# Patient Record
Sex: MALE | Race: WHITE | NOT HISPANIC OR LATINO | URBAN - METROPOLITAN AREA
[De-identification: names, ages, dates, MRNs, and addresses within clinical notes are randomized per-mention and may not be internally consistent; named-entity substitution may affect disease eponyms.]

---

## 2019-06-12 ENCOUNTER — EMERGENCY (EMERGENCY)
Facility: HOSPITAL | Age: 64
LOS: 1 days | Discharge: HOME | End: 2019-06-12
Attending: EMERGENCY MEDICINE
Payer: COMMERCIAL

## 2019-06-12 VITALS
SYSTOLIC BLOOD PRESSURE: 134 MMHG | TEMPERATURE: 98 F | RESPIRATION RATE: 18 BRPM | DIASTOLIC BLOOD PRESSURE: 96 MMHG | WEIGHT: 199.96 LBS | HEIGHT: 69 IN | HEART RATE: 79 BPM

## 2019-06-12 PROCEDURE — 99284 EMERGENCY DEPT VISIT MOD MDM: CPT

## 2019-06-12 PROCEDURE — 93010 ELECTROCARDIOGRAM REPORT: CPT

## 2019-06-12 NOTE — ED ADULT TRIAGE NOTE - CHIEF COMPLAINT QUOTE
Pt. cris c/o witnessed seizure like activity by friend.  As per EMS, no seizure activity en route, denies sz hx. Presents to Ed A&Ox4, alert,  cooperative with no complaints of pain. Able to ambulate with steady gait noted, denies pain

## 2019-06-13 VITALS
SYSTOLIC BLOOD PRESSURE: 137 MMHG | TEMPERATURE: 98 F | DIASTOLIC BLOOD PRESSURE: 84 MMHG | OXYGEN SATURATION: 98 % | RESPIRATION RATE: 18 BRPM | HEART RATE: 75 BPM

## 2019-06-13 DIAGNOSIS — I71.2 THORACIC AORTIC ANEURYSM, WITHOUT RUPTURE: ICD-10-CM

## 2019-06-13 DIAGNOSIS — Z90.49 ACQUIRED ABSENCE OF OTHER SPECIFIED PARTS OF DIGESTIVE TRACT: Chronic | ICD-10-CM

## 2019-06-13 LAB
ALBUMIN SERPL ELPH-MCNC: 4.7 G/DL — SIGNIFICANT CHANGE UP (ref 3.3–5.2)
ALP SERPL-CCNC: 179 U/L — HIGH (ref 40–120)
ALT FLD-CCNC: 35 U/L — SIGNIFICANT CHANGE UP
AMPHET UR-MCNC: NEGATIVE — SIGNIFICANT CHANGE UP
ANION GAP SERPL CALC-SCNC: 13 MMOL/L — SIGNIFICANT CHANGE UP (ref 5–17)
APPEARANCE UR: CLEAR — SIGNIFICANT CHANGE UP
APTT BLD: 26.3 SEC — LOW (ref 27.5–36.3)
AST SERPL-CCNC: 28 U/L — SIGNIFICANT CHANGE UP
BARBITURATES UR SCN-MCNC: NEGATIVE — SIGNIFICANT CHANGE UP
BASOPHILS # BLD AUTO: 0 K/UL — SIGNIFICANT CHANGE UP (ref 0–0.2)
BASOPHILS NFR BLD AUTO: 0.2 % — SIGNIFICANT CHANGE UP (ref 0–2)
BENZODIAZ UR-MCNC: NEGATIVE — SIGNIFICANT CHANGE UP
BILIRUB SERPL-MCNC: 0.5 MG/DL — SIGNIFICANT CHANGE UP (ref 0.4–2)
BILIRUB UR-MCNC: NEGATIVE — SIGNIFICANT CHANGE UP
BUN SERPL-MCNC: 26 MG/DL — HIGH (ref 8–20)
CALCIUM SERPL-MCNC: 9.7 MG/DL — SIGNIFICANT CHANGE UP (ref 8.6–10.2)
CHLORIDE SERPL-SCNC: 102 MMOL/L — SIGNIFICANT CHANGE UP (ref 98–107)
CK MB CFR SERPL CALC: 5.3 NG/ML — SIGNIFICANT CHANGE UP (ref 0–6.7)
CK SERPL-CCNC: 155 U/L — SIGNIFICANT CHANGE UP (ref 30–200)
CO2 SERPL-SCNC: 24 MMOL/L — SIGNIFICANT CHANGE UP (ref 22–29)
COCAINE METAB.OTHER UR-MCNC: NEGATIVE — SIGNIFICANT CHANGE UP
COLOR SPEC: YELLOW — SIGNIFICANT CHANGE UP
CREAT SERPL-MCNC: 0.99 MG/DL — SIGNIFICANT CHANGE UP (ref 0.5–1.3)
DIFF PNL FLD: ABNORMAL
EOSINOPHIL # BLD AUTO: 0 K/UL — SIGNIFICANT CHANGE UP (ref 0–0.5)
EOSINOPHIL NFR BLD AUTO: 0.5 % — SIGNIFICANT CHANGE UP (ref 0–5)
ETHANOL SERPL-MCNC: <10 MG/DL — SIGNIFICANT CHANGE UP
GLUCOSE SERPL-MCNC: 116 MG/DL — HIGH (ref 70–115)
GLUCOSE UR QL: NEGATIVE MG/DL — SIGNIFICANT CHANGE UP
HCT VFR BLD CALC: 48.9 % — SIGNIFICANT CHANGE UP (ref 42–52)
HGB BLD-MCNC: 16.9 G/DL — SIGNIFICANT CHANGE UP (ref 14–18)
INR BLD: 0.97 RATIO — SIGNIFICANT CHANGE UP (ref 0.88–1.16)
KETONES UR-MCNC: NEGATIVE — SIGNIFICANT CHANGE UP
LACTATE BLDV-MCNC: 1.2 MMOL/L — SIGNIFICANT CHANGE UP (ref 0.5–2)
LEUKOCYTE ESTERASE UR-ACNC: NEGATIVE — SIGNIFICANT CHANGE UP
LYMPHOCYTES # BLD AUTO: 1.2 K/UL — SIGNIFICANT CHANGE UP (ref 1–4.8)
LYMPHOCYTES # BLD AUTO: 11.8 % — LOW (ref 20–55)
MAGNESIUM SERPL-MCNC: 2.2 MG/DL — SIGNIFICANT CHANGE UP (ref 1.6–2.6)
MCHC RBC-ENTMCNC: 31.8 PG — HIGH (ref 27–31)
MCHC RBC-ENTMCNC: 34.6 G/DL — SIGNIFICANT CHANGE UP (ref 32–36)
MCV RBC AUTO: 91.9 FL — SIGNIFICANT CHANGE UP (ref 80–94)
METHADONE UR-MCNC: NEGATIVE — SIGNIFICANT CHANGE UP
MONOCYTES # BLD AUTO: 0.8 K/UL — SIGNIFICANT CHANGE UP (ref 0–0.8)
MONOCYTES NFR BLD AUTO: 7.9 % — SIGNIFICANT CHANGE UP (ref 3–10)
NEUTROPHILS # BLD AUTO: 7.8 K/UL — SIGNIFICANT CHANGE UP (ref 1.8–8)
NEUTROPHILS NFR BLD AUTO: 79.5 % — HIGH (ref 37–73)
NITRITE UR-MCNC: NEGATIVE — SIGNIFICANT CHANGE UP
OPIATES UR-MCNC: NEGATIVE — SIGNIFICANT CHANGE UP
PCP SPEC-MCNC: SIGNIFICANT CHANGE UP
PCP UR-MCNC: NEGATIVE — SIGNIFICANT CHANGE UP
PH UR: 7 — SIGNIFICANT CHANGE UP (ref 5–8)
PHOSPHATE SERPL-MCNC: 2 MG/DL — LOW (ref 2.4–4.7)
PLATELET # BLD AUTO: 228 K/UL — SIGNIFICANT CHANGE UP (ref 150–400)
POTASSIUM SERPL-MCNC: 3.9 MMOL/L — SIGNIFICANT CHANGE UP (ref 3.5–5.3)
POTASSIUM SERPL-SCNC: 3.9 MMOL/L — SIGNIFICANT CHANGE UP (ref 3.5–5.3)
PROT SERPL-MCNC: 7.6 G/DL — SIGNIFICANT CHANGE UP (ref 6.6–8.7)
PROT UR-MCNC: NEGATIVE MG/DL — SIGNIFICANT CHANGE UP
PROTHROM AB SERPL-ACNC: 11.2 SEC — SIGNIFICANT CHANGE UP (ref 10–12.9)
RBC # BLD: 5.32 M/UL — SIGNIFICANT CHANGE UP (ref 4.6–6.2)
RBC # FLD: 12.4 % — SIGNIFICANT CHANGE UP (ref 11–15.6)
RBC CASTS # UR COMP ASSIST: SIGNIFICANT CHANGE UP /HPF (ref 0–4)
SODIUM SERPL-SCNC: 139 MMOL/L — SIGNIFICANT CHANGE UP (ref 135–145)
SP GR SPEC: 1.01 — SIGNIFICANT CHANGE UP (ref 1.01–1.02)
THC UR QL: POSITIVE
TROPONIN T SERPL-MCNC: <0.01 NG/ML — SIGNIFICANT CHANGE UP (ref 0–0.06)
TSH SERPL-MCNC: 2.47 UIU/ML — SIGNIFICANT CHANGE UP (ref 0.27–4.2)
UROBILINOGEN FLD QL: NEGATIVE MG/DL — SIGNIFICANT CHANGE UP
WBC # BLD: 9.8 K/UL — SIGNIFICANT CHANGE UP (ref 4.8–10.8)
WBC # FLD AUTO: 9.8 K/UL — SIGNIFICANT CHANGE UP (ref 4.8–10.8)

## 2019-06-13 PROCEDURE — 82962 GLUCOSE BLOOD TEST: CPT

## 2019-06-13 PROCEDURE — 80307 DRUG TEST PRSMV CHEM ANLYZR: CPT

## 2019-06-13 PROCEDURE — 99223 1ST HOSP IP/OBS HIGH 75: CPT

## 2019-06-13 PROCEDURE — 83605 ASSAY OF LACTIC ACID: CPT

## 2019-06-13 PROCEDURE — 36415 COLL VENOUS BLD VENIPUNCTURE: CPT

## 2019-06-13 PROCEDURE — 81001 URINALYSIS AUTO W/SCOPE: CPT

## 2019-06-13 PROCEDURE — 99284 EMERGENCY DEPT VISIT MOD MDM: CPT

## 2019-06-13 PROCEDURE — 85610 PROTHROMBIN TIME: CPT

## 2019-06-13 PROCEDURE — 82550 ASSAY OF CK (CPK): CPT

## 2019-06-13 PROCEDURE — 70450 CT HEAD/BRAIN W/O DYE: CPT | Mod: 26

## 2019-06-13 PROCEDURE — 84484 ASSAY OF TROPONIN QUANT: CPT

## 2019-06-13 PROCEDURE — 83735 ASSAY OF MAGNESIUM: CPT

## 2019-06-13 PROCEDURE — 84100 ASSAY OF PHOSPHORUS: CPT

## 2019-06-13 PROCEDURE — 70450 CT HEAD/BRAIN W/O DYE: CPT

## 2019-06-13 PROCEDURE — 93005 ELECTROCARDIOGRAM TRACING: CPT

## 2019-06-13 PROCEDURE — 82553 CREATINE MB FRACTION: CPT

## 2019-06-13 PROCEDURE — 85730 THROMBOPLASTIN TIME PARTIAL: CPT

## 2019-06-13 PROCEDURE — 71275 CT ANGIOGRAPHY CHEST: CPT

## 2019-06-13 PROCEDURE — 71275 CT ANGIOGRAPHY CHEST: CPT | Mod: 26

## 2019-06-13 PROCEDURE — 85027 COMPLETE CBC AUTOMATED: CPT

## 2019-06-13 PROCEDURE — 80053 COMPREHEN METABOLIC PANEL: CPT

## 2019-06-13 PROCEDURE — 84443 ASSAY THYROID STIM HORMONE: CPT

## 2019-06-13 RX ORDER — SODIUM,POTASSIUM PHOSPHATES 278-250MG
2 POWDER IN PACKET (EA) ORAL ONCE
Refills: 0 | Status: COMPLETED | OUTPATIENT
Start: 2019-06-13 | End: 2019-06-13

## 2019-06-13 RX ADMIN — Medication 2 PACKET(S): at 06:08

## 2019-06-13 NOTE — ED PROVIDER NOTE - PHYSICAL EXAMINATION
Constitutional : Appears comfortably, talking in full sentences  Head :NC AT , no swelling  Eyes :eomi, no swelling  Mouth :mm moist,  Neck : supple, trachea in midline  Chest :Aguila air entry, symm chest expansion, no distress  Heart :S1 S2 distant  Abdomen :abd soft, non tender  Musc/Skel :ext no swelling, no deformity, no spine tenderness, distal pulses present  Neuro  : AAO*3, follows commands  motor aguila upper and lower ext 5/5  sensory symm and intact  CN 2-12 grossly intact  no ataxia, no nystagmus  finger to nose, symm aguila, no pronator drift

## 2019-06-13 NOTE — ED PROVIDER NOTE - CHPI ED SYMPTOMS POS
Detail Level: Detailed
Add 18607 Cpt? (Important Note: In 2017 The Use Of 75860 Is Being Tracked By Cms To Determine Future Global Period Reimbursement For Global Periods): no
SEIZURE/CHANGE IN LEVEL OF CONSCIOUSNESS

## 2019-06-13 NOTE — CONSULT NOTE ADULT - ASSESSMENT
64M with incidental finding of ascending aortic aneurysm in setting of witnessed syncope vs seizure.    Patient lives in [place], and has plans to stay through the weekend and drive back in [facility] on Monday. Patient was instructed to follow up with primary care doctor and cardiologist for blood pressure control and medication adjustment to avoid hypotension. Would advise discharge on lower dose of lisinopril and to avoid ETOH/Marijuana. Patient would be candidate for aortic registry but [place]. Discussed with Dr Martinez.

## 2019-06-13 NOTE — ED PROVIDER NOTE - CHPI ED SYMPTOMS NEG
no nausea/no vomiting/no headache, no chest pain, no visual changes, no chills, no diarrhea/no fever

## 2019-06-13 NOTE — ED ADULT NURSE NOTE - NSIMPLEMENTINTERV_GEN_ALL_ED
Implemented All Universal Safety Interventions:  [place] to call system. Call bell, personal items and telephone within reach. Instruct patient to call for assistance. Room bathroom lighting operational. Non-slip footwear when patient is off stretcher. Physically safe environment: no spills, clutter or unnecessary equipment. Stretcher in lowest position, wheels locked, appropriate side rails in place.

## 2019-06-13 NOTE — CONSULT NOTE ADULT - SUBJECTIVE AND OBJECTIVE BOX
History of Present Illness:  HPI: 64 M presented for witnessed seizure. Patient had driven 14 hours straight, had felt well although may have been somewhat dehydrated, and then had consumed ETOH and marijuana with friends after taking lisinopril, (which he feels drops his blood pressure and causes him to feel lightheaded normally.) He said he felt that way for a moment before passing out and friend's described seizure like movements. On CT scan of chest to R/O PE, an incidental finding of an ascending aortic aneurysm was noted. Patient otherwise in good health and active. Denies CP, back pain, SOB, N/V.       Past Medical History  High cholesterol  Hypertension      Past Surgical History  History of appendectomy  No significant past surgical history      MEDICATIONS  (STANDING):    MEDICATIONS  (PRN):  Lisinopril  Statin    Allergies: No Known Allergies      SOCIAL HISTORY:  Smoker: [ ] Yes  [ x] No        PACK YEARS:                         WHEN QUIT?  ETOH use: [ ] Yes  [ ] No              FREQUENCY / QUANTITY: social  Ilicit Drug use:  [ x] Yes  [ ] No occasional marijuana  Occupation: retired  Lives with wife in [place]  Assist device use: none    PMD: in [place]  Referring Cardiologist: none    Relevant Family History  FAMILY HISTORY:  Father: stroke, cancer    Review of Systems  GENERAL:  Fevers[] chills[] sweats[] fatigue[] weight loss[] weight gain []                                      [x ] NEGATIVE  NEURO:  parathesias[] seizures [x]  syncope [?]  confusion []                           LIGHTHEADED AFTER TAKES LISINOPRIL                                     [ ] NEGATIVE                 EYES: glasses[]  blurry vision[]  discharge[] pain[] glaucoma []                                                           [x ] NEGATIVE                 ENMT:  difficulty hearing []  vertigo[]  dysphagia[] epistaxis[] recent dental work []                     [x ] NEGATIVE                 CV:  chest pain[] palpitations[] KRAMER [] diaphoresis [] edema[]                                                           [x ] NEGATIVE                                 RESPIRATORY:  wheezing[] SOB[] cough [] sputum[] hemoptysis[]                                                   [x ] NEGATIVE               GI:  nausea[]  vomiting []  diarrhea[] constipation [] melena []             DIARRHEA AFTER LISINOPRIL IF TAKEN IN AM                                             [ ] NEGATIVE            : hematuria[ ]  dysuria[ ] urgency[] incontinence[]                                                                          [x ] NEGATIVE                    MUSKULOSKELETAL:  arthritis[ ]  joint swelling [ ] muscle weakness [ ]                                            [ x] NEGATIVE                     SKIN/BREAST:  rash[ ] itching [ ]  hair loss[ ] masses[ ]                                                                          [ x] NEGATIVE                     PSYCH:  dementia [ ] depression [ ] anxiety[ ]                                                                                          [x ] NEGATIVE                        HEME/LYMPH:  bruises easily[ ] enlarged lymph nodes[ ] tender lymph nodes[ ]                           [x ] NEGATIVE                      ENDOCRINE:  cold intolerance[ ] heat intolerance[ ] polydipsia[ ]                                                      [x ] NEGATIVE                        Telemetry: SR    PHYSICAL EXAM  Vital Signs Last 24 Hrs  T(C): 36.8 (2019 08:05), Max: 36.8 (2019 08:05)  T(F): 98.2 (2019 08:05), Max: 98.2 (2019 08:05)  HR: 75 (2019 08:05) (75 - 88)  BP: 137/84 (2019 08:05) (134/96 - 143/95)  BP(mean): --  RR: 18 (2019 08:05) (16 - 18)  SpO2: 98% (2019 08:05) (98% - 99%)    General: Well nourished, well developed, no acute distress.                                                         Neuro: Normal exam oriented to person/place & time with no focal motor or sensory  deficits.                    Eyes: Normal exam of conjunctiva & lids, pupils equally reactive.   ENT: Normal exam of nasal/oral mucosa with absence of cyanosis.   Neck: Normal exam of jugular veins, trachea & thyroid.   Chest: Normal lung exam with good air movement absence of wheezes, rales, or rhonchi:                                                                          CV:  Auscultation: normal [x ] S3[ ] S4[ ] Irregular [ ] Rub[ ] Clicks[ ]  Murmurs none:[x ]systolic [ ]  diastolic [ ] holosystolic [ ]  Carotids: No Bruits[x ] Other____________ Abdominal Aorta: normal [x ] nonpalpable[ ]                                                                         GI: Normal exam of abdomen, liver & spleen with no noted masses or tenderness.                                                                                              Extremities: Normal no evidence of cyanosis or deformity Edema: none[x ]trace[ ]1+[ ]2+[ ]3+[ ]4+[ ]  Lower Extremity Pulses: Right[ +] Left[+ ]Varicosities[ ]  SKIN : Normal exam to inspection & palpation.                                                           LABS:                        16.9   9.8   )-----------( 228      ( 2019 02:07 )             48.9     06-13    139  |  102  |  26.0<H>  ----------------------------<  116<H>  3.9   |  24.0  |  0.99    Ca    9.7      2019 02:07  Phos  2.0       Mg     2.2         TPro  7.6  /  Alb  4.7  /  TBili  0.5  /  DBili  x   /  AST  28  /  ALT  35  /  AlkPhos  179<H>      PT/INR - ( 2019 02:07 )   PT: 11.2 sec;   INR: 0.97 ratio         PTT - ( 2019 02:07 )  PTT:26.3 sec  Urinalysis Basic - ( 2019 02:07 )    Color: Yellow / Appearance: Clear / S.010 / pH: x  Gluc: x / Ketone: Negative  / Bili: Negative / Urobili: Negative mg/dL   Blood: x / Protein: Negative mg/dL / Nitrite: Negative   Leuk Esterase: Negative / RBC: 0-2 /HPF / WBC x   Sq Epi: x / Non Sq Epi: x / Bacteria: x      CARDIAC MARKERS ( 2019 02:07 )  x     / <0.01 ng/mL / 155 U/L / x     / 5.3 ng/mL            < from: CT Angio Chest w/ IV Cont (19 @ 04:05) >    FINDINGS:    LUNGS AND AIRWAYS: Patent central airways.  Right lower lobe air cyst. No   consolidation or suspicious pulmonary nodule. Punctate calcified   granuloma in the right upper lobe.    PLEURA: No pleural effusion.    MEDIASTINUM AND EUGENIA: No lymphadenopathy.    VESSELS: No pulmonary embolism. Main pulmonary artery normal in caliber.   Aneurysmal dilatation of the ascending aorta up to 4.2 cm.    HEART: Heart size is normal. No pericardial effusion.    CHEST WALL AND LOWER NECK: Within normal limits.    VISUALIZED UPPER ABDOMEN: Within normal limits.    BONES: Degenerative changes.    IMPRESSION:       1. No pulmonary embolism.  2. Aneurysmal dilatation of the ascending aorta up to 4.2 cm.    < end of copied text >

## 2019-06-13 NOTE — ED PROVIDER NOTE - OBJECTIVE STATEMENT
65 y/o m 14 hr drive, had dinner 2 beer, marijuana.  anxiety, everything closed in.  has smoked w liquor, not usually w beer.    no ha, did syncopize.  happened PTA, no vision changes  happened 8- 830.   seizure when passed out, woke up and was sweating and low BP.   hx murmur, no lung problems, no tobacco use, no alcohol problems  colonscopy fine, no endosc  +stress, +echo good long  HTN, recent travel, no med changes.   Crestor and HTN, HTN in morning and crestor at night bc cramping. HTN caused diarrhea.  appendectomy  auras with headaches, self medicate with ibruprofen.  friends say: contracted, not alert. sleeping snoring.  then incoherent. 65 y/o M with PMHx of heart murmur and HTN BIBA presents to ED c/o seizures onset 5 hours. Pt states that he went on a 14 hr drive today, and had 2 beers and some marijuana upon returning home. He then states "everything closed in, like anxiety." Patient stated he passed out, had a seizure, then woke up sweating with a low BP. Event was witnessed by friends, now at bedside. Pt has smoked marijuana along with alcohol in the past, but not beer, with no similar symptoms. Patient has no n/v/d, chest pain, SOB, fevers, chills, headache or visual changes. Friends who witnessed episode state that he went not alert, contracted, and then began to sleep and snore, entering an incoherent state where he was sweating, they then called EMS.  Pt has HTN, taking Crestor and HTN med. When taking medications together, pt experienced cramping. Once  the time he takes his medications, states that the HTN med gives him diarrhea. SHx appendectomy. Has had a colonoscopy, results were normal. Pt had a recent stress test and echo, both with normal results. Pt also has headaches with auras, in which he self medicates with ibuprofen.

## 2019-06-13 NOTE — ED PROVIDER NOTE - CLINICAL SUMMARY MEDICAL DECISION MAKING FREE TEXT BOX
63 y/o M with hx of social alcohol use and occasional marijuana use, drove for over 12 hours with poor fluid intake, takes diuretic every day, noted to have seizure like activity after use of alcohol and marijuana, witnessed by friends. Plans to do labs, CT, observe, and reevaluate.

## 2019-06-13 NOTE — ED ADULT NURSE REASSESSMENT NOTE - NS ED NURSE REASSESS COMMENT FT1
Pt resting comfortably in bed. No s/s of distress noted at this time. Awaiting dispo. Safety maintained.
cardio PA. at bedside to evaluate pt.
Received report from night shift RN. PT. A&Ox4. Pt. resting comfortably in no apparent distress.  As per monitor tech Janie, pt. broke A-fib at 0335 this morning into NSR with bundle branch block. Pt. denies pain or discomfort at this time.

## 2019-06-13 NOTE — ED ADULT NURSE NOTE - OBJECTIVE STATEMENT
Assumed care of patient at 0000, alert and oriented x4. As per patient he lives in [place], is visiting friends, drove 14 hours today. Pt stated he was in the yard, stated he had 2 drinks and had " a seizure". Pt reports " everything got dark and I passed out, but when I woke up I felt fine". As per patients friends he was " shaking and incoherent for about 2 minutes." Pt presents alert and oriented x4, denies pain, denies SOB or chest pains. Pt placed on CM, Afib noted to monitor. Pt denies HX of afib, DR Zuniga at bedside and aware. Iv placed labs sent. Steady gait noted. Pt neurologically intact. pt educated on plan of care, pt able to successfully teach back plan of care to RN, RN will continue to reeducate pt during hospital stay.

## 2019-06-13 NOTE — ED PROVIDER NOTE - NS ED ROS FT
no weight change, no fever or chills  + recent travel, no recent abox, no sick contacts  no recent change in medications  no rash, no bruises  no visual changes no eye discharge  no cough cold or congestion,   no sob, no chest pain  follows with cardiology  + stress test, + cath  no orthopnea, no pnd  no abd pain, no n/v/d  no endoscopy, + colonoscopy  no hematuria, no change in urinary habits  no joint pain, no deformity  +seizure, no headache, no paresthesia

## 2021-08-21 NOTE — ED ADULT NURSE NOTE - IS THE PATIENT ABLE TO BE SCREENED?
Problem: At Risk for Falls  Goal: # Patient does not fall  Outcome: Outcome Met, Continue evaluating goal progress toward completion  Goal: # Takes action to control fall-related risks  Outcome: Outcome Met, Continue evaluating goal progress toward completion  Goal: # Verbalizes understanding of fall risk/precautions  Description: Document education using the patient education activity  Outcome: Outcome Met, Continue evaluating goal progress toward completion     Problem: Pressure Injury, Risk for  Goal: # Skin remains intact  Outcome: Outcome Met, Continue evaluating goal progress toward completion  Goal: No new pressure injury (PI) development  Outcome: Outcome Met, Continue evaluating goal progress toward completion  Goal: # Verbalizes understanding of PI risk factors and prevention strategies  Description: Document education using the patient education activity.   Outcome: Outcome Met, Continue evaluating goal progress toward completion     Problem: VTE, Risk for  Goal: # No s/s of VTE  Outcome: Outcome Met, Continue evaluating goal progress toward completion  Goal: # Verbalizes understanding of VTE risk factors and prevention  Description: Document education using the patient education activity.   Outcome: Outcome Met, Continue evaluating goal progress toward completion     Problem: Airway Clearance Ineffective  Goal: Air exchange is effective with SpO2 =92% or >92% (or as ordered), without signs of aspiration  Outcome: Outcome Not Met, Plan Adjusted  Goal: Transtracheal oxygen catheter is intact with no s/s of obstructionon  Outcome: Outcome Not Met, Plan Adjusted     Problem: Pneumonia  Goal: S/S of acute pneumonia are resolved  Description: If acute pneumonia is present, monitor for resolution of fever, cough, secretions and other test values based on presentation.  Outcome: Outcome Not Met, Plan Adjusted  Goal: Verbalizes understanding of pneumonia, treatment, and ongoing prevention  Description: Document  on Patient Education Activity  Outcome: Outcome Not Met, Plan Adjusted      Yes
